# Patient Record
Sex: FEMALE | Race: WHITE | Employment: FULL TIME | URBAN - METROPOLITAN AREA
[De-identification: names, ages, dates, MRNs, and addresses within clinical notes are randomized per-mention and may not be internally consistent; named-entity substitution may affect disease eponyms.]

---

## 2017-11-06 ENCOUNTER — APPOINTMENT (OUTPATIENT)
Dept: LAB | Age: 27
End: 2017-11-06
Attending: OBSTETRICS & GYNECOLOGY
Payer: COMMERCIAL

## 2017-11-06 ENCOUNTER — OFFICE VISIT (OUTPATIENT)
Dept: OBGYN CLINIC | Facility: CLINIC | Age: 27
End: 2017-11-06

## 2017-11-06 VITALS
DIASTOLIC BLOOD PRESSURE: 52 MMHG | SYSTOLIC BLOOD PRESSURE: 103 MMHG | WEIGHT: 139.63 LBS | HEART RATE: 53 BPM | BODY MASS INDEX: 26 KG/M2

## 2017-11-06 DIAGNOSIS — Z01.419 ENCOUNTER FOR GYNECOLOGICAL EXAMINATION: Primary | ICD-10-CM

## 2017-11-06 DIAGNOSIS — Z11.3 SCREEN FOR STD (SEXUALLY TRANSMITTED DISEASE): ICD-10-CM

## 2017-11-06 PROCEDURE — 87389 HIV-1 AG W/HIV-1&-2 AB AG IA: CPT

## 2017-11-06 PROCEDURE — 86803 HEPATITIS C AB TEST: CPT

## 2017-11-06 PROCEDURE — 36415 COLL VENOUS BLD VENIPUNCTURE: CPT

## 2017-11-06 PROCEDURE — 99395 PREV VISIT EST AGE 18-39: CPT | Performed by: OBSTETRICS & GYNECOLOGY

## 2017-11-06 PROCEDURE — 86780 TREPONEMA PALLIDUM: CPT

## 2017-11-06 NOTE — PROGRESS NOTES
Judi Lin is a 24year old female [facility] Patient's last menstrual period was 10/31/2017. here for annual exam.       Last seen 4/29/16. Last pap 4/2016 normal    Menses q month. Condoms. occ dyspareunia--feels related to bowel.   Will try stoo Denies any breast pain, lumps, or discharge. Neurological:  denies headaches, extremity weakness or numbness. Psychiatric: denies depression or anxiety. Endocrine:   denies excessive thirst or urination. Heme/Lymph:  easy bruising or bleeding.     PHYS

## 2019-03-20 ENCOUNTER — OFFICE VISIT (OUTPATIENT)
Dept: DERMATOLOGY CLINIC | Facility: CLINIC | Age: 29
End: 2019-03-20
Payer: COMMERCIAL

## 2019-03-20 DIAGNOSIS — D23.60 BENIGN NEOPLASM OF SKIN OF UPPER LIMB, INCLUDING SHOULDER, UNSPECIFIED LATERALITY: ICD-10-CM

## 2019-03-20 DIAGNOSIS — D23.5 BENIGN NEOPLASM OF SKIN OF TRUNK, EXCEPT SCROTUM: ICD-10-CM

## 2019-03-20 DIAGNOSIS — D23.4 BENIGN NEOPLASM OF SCALP AND SKIN OF NECK: ICD-10-CM

## 2019-03-20 DIAGNOSIS — D22.9 MULTIPLE NEVI: Primary | ICD-10-CM

## 2019-03-20 DIAGNOSIS — D23.30 BENIGN NEOPLASM OF SKIN OF FACE: ICD-10-CM

## 2019-03-20 PROCEDURE — 99202 OFFICE O/P NEW SF 15 MIN: CPT | Performed by: DERMATOLOGY

## 2019-03-20 PROCEDURE — 99212 OFFICE O/P EST SF 10 MIN: CPT | Performed by: DERMATOLOGY

## 2019-03-31 NOTE — PROGRESS NOTES
Ritika Yo is a 34year old female. Patient presents with:  Lesion: LOV 5/23/2012 Patient present with dark itching  raised lesion on L nostril . Patient c/o having lesion for 2 years and has changed color and size 6 months ago. Patient denies a on file      Stress: Not on file    Relationships      Social connections:        Talks on phone: Not on file        Gets together: Not on file        Attends Mu-ism service: Not on file        Active member of club or organization: Not on file        A sensitivity to the sun, deeper lumps or bumps. No other skin complaints. History, medications, allergies as noted. Physical examination: Patient  well-developed well-nourished, alert oriented in no acute distress.   Exam of involved, appropriate areas patient indicates understanding of these issues and agrees to the plan. The patient is asked to return as noted in follow-up /as noted above    This note was generated using Dragon voice recognition software.   Please contact me regarding any confusion res

## 2019-10-29 ENCOUNTER — OFFICE VISIT (OUTPATIENT)
Dept: OBGYN CLINIC | Facility: CLINIC | Age: 29
End: 2019-10-29
Payer: COMMERCIAL

## 2019-10-29 VITALS
WEIGHT: 163.19 LBS | HEIGHT: 62 IN | SYSTOLIC BLOOD PRESSURE: 112 MMHG | DIASTOLIC BLOOD PRESSURE: 67 MMHG | BODY MASS INDEX: 30.03 KG/M2 | HEART RATE: 74 BPM

## 2019-10-29 DIAGNOSIS — Z12.4 SCREENING FOR MALIGNANT NEOPLASM OF CERVIX: ICD-10-CM

## 2019-10-29 DIAGNOSIS — Z01.419 ENCOUNTER FOR GYNECOLOGICAL EXAMINATION WITHOUT ABNORMAL FINDING: Primary | ICD-10-CM

## 2019-10-29 PROCEDURE — 99395 PREV VISIT EST AGE 18-39: CPT | Performed by: OBSTETRICS & GYNECOLOGY

## 2019-10-29 RX ORDER — VENLAFAXINE 37.5 MG/1
37.5 TABLET ORAL
COMMUNITY
Start: 2010-03-23 | End: 2019-10-29

## 2019-10-29 NOTE — PROGRESS NOTES
Aditi Templeton is a 34year old female [facility] Patient's last menstrual period was 10/12/2019. Patient presents with:  Gyn Exam: Annual NP  -- typically sees JLK for annuals -- last seen   .     OBSTETRICS HISTORY:  OB History    Para Term Pre week: Not on file        Minutes per session: Not on file      Stress: Not on file    Relationships      Social connections:        Talks on phone: Not on file        Gets together: Not on file        Attends Zoroastrian service: Not on file        Active me Systems:  Constitutional:    denies fever / chills  Eyes:     denies blurred or double vision  Cardiovascular:  denies chest pain or palpitations  Respiratory:    denies shortness of breath  Gastrointestinal:  denies severe abdominal pain, frequent diarrhe gynecological examination without abnormal finding    Screening for malignant neoplasm of cervix  -     THINPREP PAP WITH HPV REFLEX REQUEST B; Future  -     THINPREP PAP WITH HPV REFLEX REQUEST B      Pap only done-- pt declines GC/Chl testing.   Condoms e

## 2019-10-31 ENCOUNTER — OFFICE VISIT (OUTPATIENT)
Dept: FAMILY MEDICINE CLINIC | Facility: CLINIC | Age: 29
End: 2019-10-31
Payer: COMMERCIAL

## 2019-10-31 VITALS
BODY MASS INDEX: 29.81 KG/M2 | TEMPERATURE: 99 F | WEIGHT: 162 LBS | HEART RATE: 80 BPM | DIASTOLIC BLOOD PRESSURE: 71 MMHG | SYSTOLIC BLOOD PRESSURE: 104 MMHG | HEIGHT: 62 IN

## 2019-10-31 DIAGNOSIS — Z00.00 ANNUAL PHYSICAL EXAM: Primary | ICD-10-CM

## 2019-10-31 DIAGNOSIS — Z91.018 FOOD ALLERGY: ICD-10-CM

## 2019-10-31 DIAGNOSIS — Z28.21 INFLUENZA VACCINE REFUSED: ICD-10-CM

## 2019-10-31 PROCEDURE — 99385 PREV VISIT NEW AGE 18-39: CPT | Performed by: FAMILY MEDICINE

## 2019-10-31 NOTE — PROGRESS NOTES
Patient presents with:  Physical    HPI:   Simone Giles is a 34year old female who presents for a complete physical exam.  Patient works full-time at an office job. Lives with fiancé. Sedentary lifestyle.   Patient reports that she needs to improve nasal congestion  LUNGS: denies shortness of breath with exertion, denies orthopnea  CARDIOVASCULAR: denies chest pain on exertion  GI: denies abdominal pain,denies heartburn  : denies dysuria, vaginal discharge or itching  MUSCULOSKELETAL: denies back p

## 2019-10-31 NOTE — PATIENT INSTRUCTIONS
Prevention Guidelines, Women Ages 25 to 44  Screening tests and vaccines are an important part of managing your health. A screening test is done to find possible disorders or diseases in people who don't have any symptoms.  The goal is to find a disease ear diabetes Lifelong testing every 3 years   Type 2 diabetes All women with prediabetes Every year   Gonorrhea Sexually active women at increased risk for infection At routine exams   Hepatitis C Anyone at increased risk At routine exams   HIV All women divine Meningococcal Women at increased risk for infection should talk with their healthcare provider 1 or more doses   Pneumococcal conjugate vaccine (PCV13) and pneumococcal polysaccharide vaccine (PPSV23) Women at increased risk for infection should talk wit

## 2019-11-05 ENCOUNTER — TELEPHONE (OUTPATIENT)
Dept: FAMILY MEDICINE CLINIC | Facility: CLINIC | Age: 29
End: 2019-11-05

## 2019-11-05 NOTE — TELEPHONE ENCOUNTER
Patient states that received lab order 10/31/2019 but was told by lab unable to complete labs there. wanted to verify where can get labs done.

## 2019-11-06 NOTE — TELEPHONE ENCOUNTER
Spoke with patient, informed of fasting labs to be drawn, provided lab locations/ hours to [address], will have her labs drawn soon

## 2019-11-11 ENCOUNTER — LAB ENCOUNTER (OUTPATIENT)
Dept: LAB | Facility: HOSPITAL | Age: 29
End: 2019-11-11
Attending: FAMILY MEDICINE
Payer: COMMERCIAL

## 2019-11-11 DIAGNOSIS — Z00.00 ANNUAL PHYSICAL EXAM: ICD-10-CM

## 2019-11-11 PROCEDURE — 36415 COLL VENOUS BLD VENIPUNCTURE: CPT

## 2019-11-11 PROCEDURE — 80061 LIPID PANEL: CPT

## 2019-11-11 PROCEDURE — 80053 COMPREHEN METABOLIC PANEL: CPT

## 2019-11-11 PROCEDURE — 84443 ASSAY THYROID STIM HORMONE: CPT

## 2019-11-11 PROCEDURE — 85025 COMPLETE CBC W/AUTO DIFF WBC: CPT

## 2019-12-05 ENCOUNTER — NURSE ONLY (OUTPATIENT)
Dept: ALLERGY | Facility: CLINIC | Age: 29
End: 2019-12-05
Payer: COMMERCIAL

## 2019-12-05 ENCOUNTER — OFFICE VISIT (OUTPATIENT)
Dept: ALLERGY | Facility: CLINIC | Age: 29
End: 2019-12-05
Payer: COMMERCIAL

## 2019-12-05 VITALS
RESPIRATION RATE: 18 BRPM | SYSTOLIC BLOOD PRESSURE: 106 MMHG | TEMPERATURE: 99 F | HEART RATE: 68 BPM | OXYGEN SATURATION: 98 % | DIASTOLIC BLOOD PRESSURE: 71 MMHG

## 2019-12-05 DIAGNOSIS — J30.89 OTHER ALLERGIC RHINITIS: ICD-10-CM

## 2019-12-05 DIAGNOSIS — L50.8 ACUTE URTICARIA: ICD-10-CM

## 2019-12-05 DIAGNOSIS — T78.40XA ALLERGIC REACTION, INITIAL ENCOUNTER: Primary | ICD-10-CM

## 2019-12-05 DIAGNOSIS — J30.89 ENVIRONMENTAL AND SEASONAL ALLERGIES: ICD-10-CM

## 2019-12-05 DIAGNOSIS — K90.49 FOOD INTOLERANCE: ICD-10-CM

## 2019-12-05 PROCEDURE — 99212 OFFICE O/P EST SF 10 MIN: CPT | Performed by: ALLERGY & IMMUNOLOGY

## 2019-12-05 PROCEDURE — 99214 OFFICE O/P EST MOD 30 MIN: CPT | Performed by: ALLERGY & IMMUNOLOGY

## 2019-12-05 PROCEDURE — 95004 PERQ TESTS W/ALRGNC XTRCS: CPT | Performed by: ALLERGY & IMMUNOLOGY

## 2019-12-05 NOTE — PROGRESS NOTES
Naveed Lawrence is a 34year old female. HPI:   Patient presents with:  Food Allergy: Patient concerned if she delevoped any new allergies.  Pt developed hives when she was in a restaurant after eating fish but has never had any prior issues with fish Not Currently      Alcohol/week: 0.0 standard drinks      Comment: occ    Drug use: Not Currently      Frequency: 7.0 times per week       Medications (Active prior to today's visit):  No current outpatient medications on file.        Allergies:  No Known A Assessment   Allergic reaction, initial encounter  (primary encounter diagnosis)  Acute urticaria      Patient is a 51-year-old female who approximately 2 years ago developed a hive-like rash on her neck and face within 5 minutes of eating appetizers at

## 2019-12-05 NOTE — PATIENT INSTRUCTIONS
Recs:   Handouts on food allergies versus food intolerances provided and reviewed  Benadryl 25 mg every 4-6 hours as needed  Patient to contact my office if symptoms are recurrent

## 2021-12-10 ENCOUNTER — LAB ENCOUNTER (OUTPATIENT)
Dept: LAB | Age: 31
End: 2021-12-10
Attending: INTERNAL MEDICINE
Payer: COMMERCIAL

## 2021-12-10 ENCOUNTER — OFFICE VISIT (OUTPATIENT)
Dept: INTERNAL MEDICINE CLINIC | Facility: CLINIC | Age: 31
End: 2021-12-10
Payer: COMMERCIAL

## 2021-12-10 VITALS
HEIGHT: 62 IN | SYSTOLIC BLOOD PRESSURE: 96 MMHG | HEART RATE: 73 BPM | BODY MASS INDEX: 25.21 KG/M2 | WEIGHT: 137 LBS | DIASTOLIC BLOOD PRESSURE: 66 MMHG

## 2021-12-10 DIAGNOSIS — K21.9 GASTROESOPHAGEAL REFLUX DISEASE WITHOUT ESOPHAGITIS: ICD-10-CM

## 2021-12-10 DIAGNOSIS — R10.33 PERIUMBILICAL ABDOMINAL PAIN: Primary | ICD-10-CM

## 2021-12-10 DIAGNOSIS — Z00.00 PE (PHYSICAL EXAM), ROUTINE: ICD-10-CM

## 2021-12-10 DIAGNOSIS — R10.33 PERIUMBILICAL ABDOMINAL PAIN: ICD-10-CM

## 2021-12-10 PROCEDURE — 83690 ASSAY OF LIPASE: CPT

## 2021-12-10 PROCEDURE — 80061 LIPID PANEL: CPT

## 2021-12-10 PROCEDURE — 85025 COMPLETE CBC W/AUTO DIFF WBC: CPT

## 2021-12-10 PROCEDURE — 84443 ASSAY THYROID STIM HORMONE: CPT

## 2021-12-10 PROCEDURE — 81003 URINALYSIS AUTO W/O SCOPE: CPT

## 2021-12-10 PROCEDURE — 36415 COLL VENOUS BLD VENIPUNCTURE: CPT

## 2021-12-10 PROCEDURE — 80053 COMPREHEN METABOLIC PANEL: CPT

## 2021-12-10 PROCEDURE — 3008F BODY MASS INDEX DOCD: CPT | Performed by: INTERNAL MEDICINE

## 2021-12-10 PROCEDURE — 99214 OFFICE O/P EST MOD 30 MIN: CPT | Performed by: INTERNAL MEDICINE

## 2021-12-10 PROCEDURE — 3078F DIAST BP <80 MM HG: CPT | Performed by: INTERNAL MEDICINE

## 2021-12-10 PROCEDURE — 3074F SYST BP LT 130 MM HG: CPT | Performed by: INTERNAL MEDICINE

## 2021-12-10 RX ORDER — FAMOTIDINE 20 MG/1
20 TABLET ORAL
Qty: 60 TABLET | Refills: 0 | Status: SHIPPED | OUTPATIENT
Start: 2021-12-10

## 2021-12-10 NOTE — PROGRESS NOTES
Subjective:   Patient ID: Rayne Wolfe is a 36year old female. Patient presents with:  Abdominal Pain: C/o on/off abdominal pain for about 6 months. Stts she has noticed some constipation and diarrhea. Also there's tenderness.   Pain occurs everyti some food      Develops occasionally diarrhea mostly if this is more dairy food     Genitourinary: Negative for dysuria, frequency and hematuria. Skin: Negative for pallor. Neurological: Negative for dizziness and headaches.    Psychiatric/Behavioral: N normal. No respiratory distress. Breath sounds: Normal breath sounds. No wheezing or rales. Abdominal:      General: Bowel sounds are normal. There is no distension. Palpations: Abdomen is soft. There is no mass. Tenderness:  There is abdom Placed This Encounter      CBC With Differential With Platelet      Comp Metabolic Panel (14)      Lipid Panel      TSH W Reflex To Free T4      Urinalysis with Culture Reflex      Lipase [E]      Meds This Visit:  Requested Prescriptions     Signed Prescr

## 2021-12-19 ENCOUNTER — PATIENT MESSAGE (OUTPATIENT)
Dept: INTERNAL MEDICINE CLINIC | Facility: CLINIC | Age: 31
End: 2021-12-19

## 2021-12-20 NOTE — TELEPHONE ENCOUNTER
From: Daniel Stands  To: Janak Nava MD  Sent: 12/19/2021 7:32 PM CST  Subject: Question regarding COMP METABOLIC PANEL (14)    By my urine tests results are unclear, is that a bad? should I be concerned?  I drink 64 oz of water a day at least so

## 2022-01-03 ENCOUNTER — OFFICE VISIT (OUTPATIENT)
Dept: INTERNAL MEDICINE CLINIC | Facility: CLINIC | Age: 32
End: 2022-01-03

## 2022-01-03 VITALS
BODY MASS INDEX: 25.03 KG/M2 | SYSTOLIC BLOOD PRESSURE: 112 MMHG | WEIGHT: 136 LBS | DIASTOLIC BLOOD PRESSURE: 76 MMHG | HEART RATE: 73 BPM | HEIGHT: 62 IN

## 2022-01-03 DIAGNOSIS — K21.9 GASTROESOPHAGEAL REFLUX DISEASE WITHOUT ESOPHAGITIS: Primary | ICD-10-CM

## 2022-01-03 DIAGNOSIS — R10.33 PERIUMBILICAL ABDOMINAL PAIN: ICD-10-CM

## 2022-01-03 PROCEDURE — 3074F SYST BP LT 130 MM HG: CPT | Performed by: INTERNAL MEDICINE

## 2022-01-03 PROCEDURE — 3008F BODY MASS INDEX DOCD: CPT | Performed by: INTERNAL MEDICINE

## 2022-01-03 PROCEDURE — 99213 OFFICE O/P EST LOW 20 MIN: CPT | Performed by: INTERNAL MEDICINE

## 2022-01-03 PROCEDURE — 3078F DIAST BP <80 MM HG: CPT | Performed by: INTERNAL MEDICINE

## 2022-01-03 RX ORDER — FAMOTIDINE 20 MG/1
20 TABLET ORAL 2 TIMES DAILY
Qty: 60 TABLET | Refills: 1 | Status: SHIPPED | OUTPATIENT
Start: 2022-01-03

## 2022-01-03 NOTE — PROGRESS NOTES
Subjective:   Patient ID: Dian Isaac is a 36year old female. Patient presents with:  Abdominal Pain: F/u on abdominal pain. Stts that pain has resolved. U/S of abdomen is scheduled for 1/5/22.   Stts she still has a little heartburn when she eats been unchanged. The symptoms are aggravated by certain foods. Pertinent negatives include no fatigue or weight loss. Treatments tried: tums. The treatment provided moderate relief.        History/Other:   Review of Systems   Constitutional: Negative for chi not in acute distress. Appearance: She is well-developed. Interventions: Face mask in place. HENT:      Head: Normocephalic and atraumatic. Nose:      Right Sinus: No maxillary sinus tenderness or frontal sinus tenderness.       Left Sinus: NSAID's - Aspirin-based medications  Advised to avoid wearing  tight clothes   Advised to elevate the head of the bed   Avoid eating at least 3 hours before bedtime   Counseling on ideal weight/BMI  Take famotidine qd in the morning 30-60 minutes before br

## 2022-01-05 ENCOUNTER — HOSPITAL ENCOUNTER (OUTPATIENT)
Dept: ULTRASOUND IMAGING | Age: 32
Discharge: HOME | End: 2022-01-05
Attending: INTERNAL MEDICINE
Payer: COMMERCIAL

## 2022-01-05 ENCOUNTER — PATIENT MESSAGE (OUTPATIENT)
Dept: INTERNAL MEDICINE CLINIC | Facility: CLINIC | Age: 32
End: 2022-01-05

## 2022-01-05 DIAGNOSIS — R10.2 PELVIC PAIN: Primary | ICD-10-CM

## 2022-01-05 DIAGNOSIS — R10.33 PERIUMBILICAL ABDOMINAL PAIN: ICD-10-CM

## 2022-01-05 PROCEDURE — 76700 US EXAM ABDOM COMPLETE: CPT | Performed by: INTERNAL MEDICINE

## 2022-01-05 NOTE — TELEPHONE ENCOUNTER
From: Ngoc Graham  To: Carmencita Smith MD  Sent: 1/5/2022 10:02 AM CST  Subject: ultra sound    I just got done with the ultra sound of my abdomen but the tech did not look at my lower abdomen.  They said the doctor would have to order a pelvic ult

## 2022-01-07 ENCOUNTER — PATIENT MESSAGE (OUTPATIENT)
Dept: INTERNAL MEDICINE CLINIC | Facility: CLINIC | Age: 32
End: 2022-01-07

## 2022-01-07 NOTE — TELEPHONE ENCOUNTER
Noted - I will order Us pelvis ,but  might need to have eval in office .   F/u visit within   1-2 weeks

## 2022-01-07 NOTE — TELEPHONE ENCOUNTER
From: Ngoc Graham  To: Carmencita Smith MD  Sent: 1/7/2022 11:54 AM CST  Subject: medication    The medication that was prescribed to me to help heal my stomach lining, is it okay that I continue taking my probiotic while im taking this medication?

## 2022-01-24 ENCOUNTER — TELEPHONE (OUTPATIENT)
Dept: INTERNAL MEDICINE CLINIC | Facility: CLINIC | Age: 32
End: 2022-01-24

## 2022-01-24 NOTE — TELEPHONE ENCOUNTER
----- Message from Fabio. Waldo Nicola sent at 1/24/2022  2:49 PM CST -----  Regarding: Stomach ache/diarrhea   Hello, I have had a stomach ache for 3 days.  I must have eaten something my body didnt agree with, and since then when I eat I get a stomach ache

## 2022-01-24 NOTE — TELEPHONE ENCOUNTER
Dr. Tamar Portillo: please see [employer]. Patient has upcoming appt 2/7/22. LOV 1/3/22, and 12/10/21 for abd pains.

## 2022-01-25 NOTE — TELEPHONE ENCOUNTER
Recommend patient to avoid any sugar and fatty  Food , fried food at this time    Keep with [address] ..   Avoid coffee and tea with caffeine  Keep with good water hydration     Symptoms should get get better gradually   if any fever  ,abdominal

## 2023-06-15 ENCOUNTER — OFFICE VISIT (OUTPATIENT)
Dept: OBGYN CLINIC | Facility: CLINIC | Age: 33
End: 2023-06-15

## 2023-06-15 ENCOUNTER — LAB ENCOUNTER (OUTPATIENT)
Dept: LAB | Facility: HOSPITAL | Age: 33
End: 2023-06-15
Attending: ADVANCED PRACTICE MIDWIFE
Payer: COMMERCIAL

## 2023-06-15 VITALS
SYSTOLIC BLOOD PRESSURE: 107 MMHG | DIASTOLIC BLOOD PRESSURE: 73 MMHG | BODY MASS INDEX: 27.42 KG/M2 | HEIGHT: 62 IN | HEART RATE: 84 BPM | WEIGHT: 149 LBS

## 2023-06-15 DIAGNOSIS — Z11.3 SCREENING FOR STDS (SEXUALLY TRANSMITTED DISEASES): ICD-10-CM

## 2023-06-15 DIAGNOSIS — Z01.419 WOMEN'S ANNUAL ROUTINE GYNECOLOGICAL EXAMINATION: Primary | ICD-10-CM

## 2023-06-15 DIAGNOSIS — N89.8 VAGINAL DISCHARGE: ICD-10-CM

## 2023-06-15 LAB — HCV AB SERPL QL IA: NONREACTIVE

## 2023-06-15 PROCEDURE — 99385 PREV VISIT NEW AGE 18-39: CPT | Performed by: ADVANCED PRACTICE MIDWIFE

## 2023-06-15 PROCEDURE — 36415 COLL VENOUS BLD VENIPUNCTURE: CPT

## 2023-06-15 PROCEDURE — 87389 HIV-1 AG W/HIV-1&-2 AB AG IA: CPT

## 2023-06-15 PROCEDURE — 3074F SYST BP LT 130 MM HG: CPT | Performed by: ADVANCED PRACTICE MIDWIFE

## 2023-06-15 PROCEDURE — 3078F DIAST BP <80 MM HG: CPT | Performed by: ADVANCED PRACTICE MIDWIFE

## 2023-06-15 PROCEDURE — 86780 TREPONEMA PALLIDUM: CPT

## 2023-06-15 PROCEDURE — 86803 HEPATITIS C AB TEST: CPT

## 2023-06-15 PROCEDURE — 3008F BODY MASS INDEX DOCD: CPT | Performed by: ADVANCED PRACTICE MIDWIFE

## 2023-06-16 LAB
C TRACH DNA SPEC QL NAA+PROBE: NEGATIVE
N GONORRHOEA DNA SPEC QL NAA+PROBE: NEGATIVE
T PALLIDUM AB SER QL: NEGATIVE
T VAGINALIS RRNA SPEC QL NAA+PROBE: NEGATIVE

## 2023-06-17 LAB
GENITAL VAGINOSIS SCREEN: NEGATIVE
TRICHOMONAS SCREEN: NEGATIVE

## 2024-04-16 ENCOUNTER — OFFICE VISIT (OUTPATIENT)
Dept: OBGYN CLINIC | Facility: CLINIC | Age: 34
End: 2024-04-16
Payer: COMMERCIAL

## 2024-04-16 ENCOUNTER — LAB ENCOUNTER (OUTPATIENT)
Dept: LAB | Age: 34
End: 2024-04-16
Attending: ADVANCED PRACTICE MIDWIFE
Payer: COMMERCIAL

## 2024-04-16 VITALS
BODY MASS INDEX: 28.34 KG/M2 | HEART RATE: 63 BPM | HEIGHT: 62 IN | DIASTOLIC BLOOD PRESSURE: 69 MMHG | SYSTOLIC BLOOD PRESSURE: 102 MMHG | WEIGHT: 154 LBS

## 2024-04-16 DIAGNOSIS — Z01.419 WOMEN'S ANNUAL ROUTINE GYNECOLOGICAL EXAMINATION: Primary | ICD-10-CM

## 2024-04-16 DIAGNOSIS — Z11.3 SCREEN FOR STD (SEXUALLY TRANSMITTED DISEASE): ICD-10-CM

## 2024-04-16 DIAGNOSIS — N89.8 VAGINAL DISCHARGE: ICD-10-CM

## 2024-04-16 DIAGNOSIS — Z12.4 PAP SMEAR FOR CERVICAL CANCER SCREENING: ICD-10-CM

## 2024-04-16 DIAGNOSIS — Z01.419 WOMEN'S ANNUAL ROUTINE GYNECOLOGICAL EXAMINATION: ICD-10-CM

## 2024-04-16 LAB
HBV SURFACE AG SER-ACNC: 0.22 [IU]/L
HBV SURFACE AG SERPL QL IA: NONREACTIVE
HCV AB SERPL QL IA: NONREACTIVE
T PALLIDUM AB SER QL IA: NONREACTIVE

## 2024-04-16 PROCEDURE — 87389 HIV-1 AG W/HIV-1&-2 AB AG IA: CPT | Performed by: ADVANCED PRACTICE MIDWIFE

## 2024-04-16 PROCEDURE — 3078F DIAST BP <80 MM HG: CPT | Performed by: ADVANCED PRACTICE MIDWIFE

## 2024-04-16 PROCEDURE — 87340 HEPATITIS B SURFACE AG IA: CPT | Performed by: ADVANCED PRACTICE MIDWIFE

## 2024-04-16 PROCEDURE — 3074F SYST BP LT 130 MM HG: CPT | Performed by: ADVANCED PRACTICE MIDWIFE

## 2024-04-16 PROCEDURE — 3008F BODY MASS INDEX DOCD: CPT | Performed by: ADVANCED PRACTICE MIDWIFE

## 2024-04-16 PROCEDURE — 86803 HEPATITIS C AB TEST: CPT | Performed by: ADVANCED PRACTICE MIDWIFE

## 2024-04-16 PROCEDURE — 99395 PREV VISIT EST AGE 18-39: CPT | Performed by: ADVANCED PRACTICE MIDWIFE

## 2024-04-16 PROCEDURE — 86780 TREPONEMA PALLIDUM: CPT | Performed by: ADVANCED PRACTICE MIDWIFE

## 2024-04-16 NOTE — PROGRESS NOTES
Chief Complaint:   Chief Complaint   Patient presents with    Annual     Annual, last pap 2019 normal ,  Vaginal odor ,vaginal discharge       HPI:   Daisy is 33 year old female, here today for annual gyne exam.  Patient's last menstrual period was 03/25/2024..   Menarche: 14 years old.   Period Cycle (Days): Regular cycles.   Period Duration (Days): 4 days.   Period Flow: Moderate bleed.   Hx Prior Abnormal Pap: No  Pap Date: 10/29/19   Denies abnormal discharge or vaginal irritation.   Had an odor after sex recently but it went away.    Current Partners: multiple partners, male  Birth Control Method: Uses condoms 30% of the time, tracks her cycles  H/O of STI's: denies  Last STI screen: Desires this testing today  Additional information:      Last Pap: 10/2019 NILM      H/O abnormal Pap: denies      Last mammogram (if applicable): n/a; maternal grandma had breast CA in 50s   Lifetime breast cancer risk 11.8%    HISTORY:  Past Medical History:    History of abnormal cervical Pap smear      History reviewed. No pertinent surgical history.   Family History   Problem Relation Age of Onset    Cancer Paternal Grandmother         Lung Cancer     Heart Disease Maternal Grandmother         CAD    Diabetes Maternal Grandmother     Breast Cancer Maternal Grandmother     Hypertension Maternal Grandfather     Cancer Father         ?testicular    Other (Other) Father         Diverticulitis    No Known Problems Mother     Hypertension Paternal Grandfather     Ovarian Cancer Neg     Uterine Cancer Neg     Pancreatic Cancer Neg     Colon Cancer Neg       Social History:   Social History     Socioeconomic History    Marital status: Single   Tobacco Use    Smoking status: Former     Types: Cigarettes    Smokeless tobacco: Never    Tobacco comments:     social - marijuana   Vaping Use    Vaping status: Never Used   Substance and Sexual Activity    Alcohol use: Yes     Alcohol/week: 0.0 standard drinks of alcohol     Comment: occ;  glass of wine once a week    Drug use: Yes     Frequency: 7.0 times per week     Types: Cannabis     Comment: once per month    Sexual activity: Yes     Partners: Male     Birth control/protection: Condom   Other Topics Concern    Caffeine Concern Yes     Comment: chocolate, coffee occ    Reaction to local anesthetic No        Medications (Active prior to today's visit):  Current Outpatient Medications   Medication Sig Dispense Refill    BACILLUS COAGULANS-INULIN OR Take by mouth daily.      Cholecalciferol (VITAMIN D3 OR) Take 1 tablet by mouth daily.         Allergies:  Allergies   Allergen Reactions    Clams HIVES and ITCHING    Grass OTHER (SEE COMMENTS)     Throat irritation and chest heaviness    Other OTHER (SEE COMMENTS)     Maple trees    Ragweed OTHER (SEE COMMENTS)     Throat irritation, and chest heaviness       HISTORY:  Past Medical History:    History of abnormal cervical Pap smear      History reviewed. No pertinent surgical history.   Family History   Problem Relation Age of Onset    Cancer Paternal Grandmother         Lung Cancer     Heart Disease Maternal Grandmother         CAD    Diabetes Maternal Grandmother     Breast Cancer Maternal Grandmother     Hypertension Maternal Grandfather     Cancer Father         ?testicular    Other (Other) Father         Diverticulitis    No Known Problems Mother     Hypertension Paternal Grandfather     Ovarian Cancer Neg     Uterine Cancer Neg     Pancreatic Cancer Neg     Colon Cancer Neg       Social History:   Social History     Socioeconomic History    Marital status: Single   Tobacco Use    Smoking status: Former     Types: Cigarettes    Smokeless tobacco: Never    Tobacco comments:     social - marijuana   Vaping Use    Vaping status: Never Used   Substance and Sexual Activity    Alcohol use: Yes     Alcohol/week: 0.0 standard drinks of alcohol     Comment: occ; glass of wine once a week    Drug use: Yes     Frequency: 7.0 times per week     Types:  Cannabis     Comment: once per month    Sexual activity: Yes     Partners: Male     Birth control/protection: Condom   Other Topics Concern    Caffeine Concern Yes     Comment: chocolate, coffee occ    Reaction to local anesthetic No        Medications (Active prior to today's visit):  Current Outpatient Medications   Medication Sig Dispense Refill    BACILLUS COAGULANS-INULIN OR Take by mouth daily.      Cholecalciferol (VITAMIN D3 OR) Take 1 tablet by mouth daily.         Allergies:  Allergies   Allergen Reactions    Clams HIVES and ITCHING    Grass OTHER (SEE COMMENTS)     Throat irritation and chest heaviness    Other OTHER (SEE COMMENTS)     Maple trees    Ragweed OTHER (SEE COMMENTS)     Throat irritation, and chest heaviness         ROS:   Review of Systems   Constitutional: Negative.    Respiratory: Negative.     Cardiovascular: Negative.    Gastrointestinal: Negative.    Genitourinary:  Positive for vaginal discharge. Negative for difficulty urinating, dyspareunia, dysuria, frequency, genital sores, hematuria, menstrual problem, pelvic pain, urgency, vaginal bleeding and vaginal pain.   Neurological: Negative.    Psychiatric/Behavioral: Negative.         Denies having little interest in activities in past month  Denies being bothered by feeling down, depressed or hopeless in the last month  Denies fibroids, ovarian cysts, PID, infertility or any other gynecologic problems  Denies painful intercourse, decreased libido or sexual dysfunction.  Denies history of rape or childhood sexual abuse.  Denies being hit, kicked, punched or otherwise hurt by someone in the past year.  Feels safe in her current relationship. Denies partner from a previous relationship is making her feel unsafe  PHYSICAL EXAM:     Vitals:    04/16/24 1015   BP: 102/69   Pulse: 63     Physical Exam  Vitals reviewed.   Constitutional:       General: She is not in acute distress.     Appearance: Normal appearance. She is normal weight. She is  not ill-appearing.   HENT:      Head: Normocephalic and atraumatic.   Cardiovascular:      Rate and Rhythm: Normal rate.   Pulmonary:      Effort: Pulmonary effort is normal. No respiratory distress.   Chest:   Breasts:     Right: Normal. No mass, nipple discharge, skin change or tenderness.      Left: Normal. No mass, nipple discharge, skin change or tenderness.   Genitourinary:     General: Normal vulva.      Labia:         Right: No rash, tenderness or lesion.         Left: No rash, tenderness or lesion.       Vagina: Vaginal discharge present. No erythema, tenderness, bleeding or lesions.      Cervix: No cervical motion tenderness, discharge, friability, lesion or cervical bleeding.      Uterus: Not enlarged and not tender.       Adnexa:         Right: No mass, tenderness or fullness.          Left: No mass, tenderness or fullness.        Comments: Creamy white vaginal discharge present  Uterus retroverted  Musculoskeletal:      Right lower leg: No edema.      Left lower leg: No edema.   Skin:     General: Skin is warm and dry.   Neurological:      Mental Status: She is alert and oriented to person, place, and time.   Psychiatric:         Mood and Affect: Mood normal.         Behavior: Behavior normal.         Thought Content: Thought content normal.         Judgment: Judgment normal.           ASSESSMENT/PLAN:        Diagnoses and all orders for this visit:    Women's annual routine gynecological examination  -     HCV Antibody; Future  -     Hepatitis B Surface Antigen; Future  -     HIV AG AB Combo; Future  -     T PALLIDUM SCREENING CASCADE; Future  -     Chlamydia/Gc Amplification; Future  -     ThinPrep PAP Smear; Future  -     Hpv Dna  High Risk , Thin Prep Collect; Future  -     Vaginitis Vaginosis PCR Panel; Future    Pap smear for cervical cancer screening  -     ThinPrep PAP Smear; Future  -     Hpv Dna  High Risk , Thin Prep Collect; Future    Screen for STD (sexually transmitted disease)  -     HCV  Antibody; Future  -     Hepatitis B Surface Antigen; Future  -     HIV AG AB Combo; Future  -     T PALLIDUM SCREENING CASCADE; Future  -     Chlamydia/Gc Amplification; Future    Vaginal discharge  -     Vaginitis Vaginosis PCR Panel; Future           Pt was informed that the portal is not to be considered for emergency communication as it is not monitored 24/7. Informed, instead, to call the clinic and the afterhours answering service will page the provider on-call. Pt voiced understanding and agreed    Counseling:  Best hygiene practices  Contraceptive method(s), STI and HIV risk reduction/condom use  Emergency contraception reviewed  Frequency of health screening and personal risks  Pap, SBE/CBE, mammography  Preconception counseling, including use of folic acid  Benefits of daily vitamin D, folic acid, and adequate fresh fruits and vegetables  Benefits of daily exercise     4/16/2024  Diana Downey CNM

## 2024-04-17 LAB
BV BACTERIA DNA VAG QL NAA+PROBE: POSITIVE
C GLABRATA DNA VAG QL NAA+PROBE: NEGATIVE
C KRUSEI DNA VAG QL NAA+PROBE: NEGATIVE
C TRACH DNA SPEC QL NAA+PROBE: NEGATIVE
CANDIDA DNA VAG QL NAA+PROBE: POSITIVE
HPV I/H RISK 1 DNA SPEC QL NAA+PROBE: NEGATIVE
N GONORRHOEA DNA SPEC QL NAA+PROBE: NEGATIVE
T VAGINALIS DNA VAG QL NAA+PROBE: NEGATIVE

## 2024-04-17 RX ORDER — METRONIDAZOLE 7.5 MG/G
1 GEL VAGINAL NIGHTLY
Qty: 70 G | Refills: 0 | Status: SHIPPED | OUTPATIENT
Start: 2024-04-17 | End: 2024-04-18

## 2024-04-17 RX ORDER — FLUCONAZOLE 150 MG/1
150 TABLET ORAL ONCE
Qty: 1 TABLET | Refills: 0 | Status: SHIPPED | OUTPATIENT
Start: 2024-04-17 | End: 2024-04-17

## 2024-04-18 ENCOUNTER — TELEPHONE (OUTPATIENT)
Dept: OBGYN CLINIC | Facility: CLINIC | Age: 34
End: 2024-04-18

## 2024-04-18 RX ORDER — METRONIDAZOLE 7.5 MG/G
1 GEL VAGINAL NIGHTLY
Qty: 70 G | Refills: 0 | Status: SHIPPED | OUTPATIENT
Start: 2024-04-18 | End: 2024-04-23

## (undated) NOTE — MR AVS SNAPSHOT
After Visit Summary   4/16/2024    Daisy Chairez   MRN: [id number]           Visit Information     Date & Time  4/16/2024 10:30 AM Provider  Diana Downey CNM Department  [facility] - Midwifery Dept. Phone  [phone]      Your Vitals Were  Most recent update: 4/16/2024 10:18 AM    BP   102/69 (BP Location: Right arm, Patient Position: Sitting, Cuff Size: adult)          Pulse   63          Ht   62\"          Wt   154 lb          LMP   03/25/2024             BMI   28.17 kg/m²         Allergies as of 4/16/2024  Review status set to Review Complete on 4/16/2024       Noted Reaction Type Reactions    Clams 12/03/2017    HIVES, ITCHING    Grass 12/10/2021    OTHER (SEE COMMENTS)    Throat irritation and chest heaviness    Other 12/10/2021    OTHER (SEE COMMENTS)    Maple trees    Ragweed 12/10/2021    OTHER (SEE COMMENTS)    Throat irritation, and chest heaviness      Your Current Medications        Dosage    BACILLUS COAGULANS-INULIN OR Take by mouth daily.    Cholecalciferol (VITAMIN D3 OR) Take 1 tablet by mouth daily.      Diagnoses for This Visit    Women's annual routine gynecological examination   [2588664]  -  Primary  Pap smear for cervical cancer screening   [637375]    Screen for STD (sexually transmitted disease)   [206646]    Vaginal discharge   [227031]             Follow-up    Return in about 1 year (around 4/16/2025) for Next annual.     We Ordered the Following     Normal Orders This Visit    Chlamydia/Gc Amplification [2640629 CUSTOM]     Hpv Dna  High Risk , Thin Prep Collect [[id number] CUSTOM]     THINPREP PAP SMEAR ONLY [[id number] CUSTOM]     ThinPrep PAP Smear [[id number] CUSTOM]     Vaginitis Vaginosis PCR Panel [[id number] CUSTOM]     Future Labs/Procedures Expected by Expires    Chlamydia/Gc Amplification [[id number] CUSTOM]  4/16/2024 (Approximate) 4/16/2025    HCV Antibody [[id number] CUSTOM]  4/16/2024 (Approximate) 4/16/2025    Hepatitis B Surface  Antigen [[id number] CUSTOM]  4/16/2024 (Approximate) 4/16/2025    HIV AG AB Combo [[id number] CUSTOM]  4/16/2024 (Approximate) 4/16/2025    Hpv Dna  High Risk , Thin Prep Collect [[id number] CUSTOM]  4/16/2024 4/16/2025    T PALLIDUM SCREENING CASCADE [[id number] CPT(R)]  4/16/2024 (Approximate) 4/16/2025    ThinPrep PAP Smear [[id number] CUSTOM]  4/16/2024 4/16/2025    Vaginitis Vaginosis PCR Panel [[id number] CUSTOM]  4/16/2024 (Approximate) 4/16/2025      Follow-up Instructions    Return in about 1 year (around 4/16/2025) for Next annual.                  Did you know that [facility] primary care physicians now offer Video Visits through [employer] for adult patients for a variety of conditions such as allergies, back pain and cold symptoms? Skip the drive and waiting room and online chat with a doctor face-to-face using your web-cam enabled computer or mobile device wherever you are. Video Visits cost $50 and can be paid hassle-free using a credit, debit, or health savings card.  Not active on [employer]? Ask us how to get signed up today!          If you receive a survey from [employer], please take a few minutes to complete it and provide feedback. We strive to deliver the best patient experience and are looking for ways to make improvements. Your feedback will help us do so. For more information on [employer], please visit [email or web address]           No text in SmartText           No text in SmartText

## (undated) NOTE — MR AVS SNAPSHOT
After Visit Summary   10/29/2019    Ritika Yo    MRN: [id number]           Visit Information     Date & Time  10/29/2019 10:40 AM Provider  Darin Ngo MD [address] [place] [address], [address] [place] [address],[address], [facility]  Phone process. If you do not sign up before the expiration date, you must request a new code. [employer] Activation Code: [email or web address]  Expires: 12/27/2019 10:40 AM    4. Enter your Zip Code and Date of Birth (mm/dd/yyyy) as indicated and click Next.  You will be active are less likely to develop some chronic diseases than adults who are inactive.      HOW TO GET STARTED: HOW TO STAY MOTIVATED:   Start activities slowly and build up over time Do what you like   Get your heart pumping – brisk walking, biking, swimmin Treatment for mild   illness or injury that does   not require immediate attention   VIDEO VISITS  Average cost  $35*    e-VISITS  Average cost  $35*      [address]  Monday - Friday  10: